# Patient Record
Sex: FEMALE | Race: WHITE | NOT HISPANIC OR LATINO | Employment: FULL TIME | URBAN - METROPOLITAN AREA
[De-identification: names, ages, dates, MRNs, and addresses within clinical notes are randomized per-mention and may not be internally consistent; named-entity substitution may affect disease eponyms.]

---

## 2023-11-19 ENCOUNTER — HOSPITAL ENCOUNTER (EMERGENCY)
Facility: HOSPITAL | Age: 53
Discharge: HOME | End: 2023-11-20
Payer: COMMERCIAL

## 2023-11-19 VITALS
OXYGEN SATURATION: 98 % | SYSTOLIC BLOOD PRESSURE: 174 MMHG | RESPIRATION RATE: 18 BRPM | HEIGHT: 59 IN | HEART RATE: 84 BPM | DIASTOLIC BLOOD PRESSURE: 100 MMHG | WEIGHT: 150 LBS | BODY MASS INDEX: 30.24 KG/M2 | TEMPERATURE: 98.2 F
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
## 2023-11-19 DIAGNOSIS — Z23 ENCOUNTER FOR IMMUNIZATION: ICD-10-CM

## 2023-11-19 DIAGNOSIS — S61.012A LACERATION OF LEFT THUMB WITHOUT FOREIGN BODY WITHOUT DAMAGE TO NAIL, INITIAL ENCOUNTER: Primary | ICD-10-CM

## 2023-11-19 PROBLEM — M54.9 BACK PAIN: Status: ACTIVE | Noted: 2018-06-19

## 2023-11-19 PROBLEM — N20.0 KIDNEY STONE: Status: ACTIVE | Noted: 2023-11-19

## 2023-11-19 PROBLEM — N20.1 RIGHT URETERAL STONE: Status: ACTIVE | Noted: 2023-11-19

## 2023-11-19 PROCEDURE — 99285 EMERGENCY DEPT VISIT HI MDM: CPT

## 2023-11-19 PROCEDURE — 12001 RPR S/N/AX/GEN/TRNK 2.5CM/<: CPT

## 2023-11-19 PROCEDURE — 99283 EMERGENCY DEPT VISIT LOW MDM: CPT | Mod: 25

## 2023-11-19 RX ORDER — LIDOCAINE HYDROCHLORIDE 10 MG/ML
5 INJECTION INFILTRATION; PERINEURAL ONCE
Status: COMPLETED | OUTPATIENT
Start: 2023-11-19 | End: 2023-11-20

## 2023-11-19 RX ORDER — METFORMIN HYDROCHLORIDE 500 MG/1
1500 TABLET, EXTENDED RELEASE ORAL
COMMUNITY
Start: 2023-10-17

## 2023-11-19 RX ORDER — LISINOPRIL 40 MG/1
40 TABLET ORAL
COMMUNITY
Start: 2023-10-17

## 2023-11-19 RX ORDER — ACETAMINOPHEN 325 MG/1
975 TABLET ORAL ONCE
Status: COMPLETED | OUTPATIENT
Start: 2023-11-19 | End: 2023-11-20

## 2023-11-19 RX ORDER — BACITRACIN ZINC 500 UNIT/G
1 OINTMENT IN PACKET (EA) TOPICAL ONCE
Status: COMPLETED | OUTPATIENT
Start: 2023-11-19 | End: 2023-11-20

## 2023-11-19 RX ORDER — ROSUVASTATIN CALCIUM 20 MG/1
1 TABLET, COATED ORAL NIGHTLY
COMMUNITY
Start: 2023-10-09

## 2023-11-19 RX ORDER — AMLODIPINE BESYLATE 2.5 MG/1
2.5 TABLET ORAL
COMMUNITY
Start: 2023-11-15

## 2023-11-19 NOTE — Clinical Note
Ira Qureshi was seen and treated in our emergency department on 11/19/2023.  She may return to work on 11/22/2023.       If you have any questions or concerns, please don't hesitate to call.      Aziza Ibrahim PA-C

## 2023-11-20 PROCEDURE — 2500000005 HC RX 250 GENERAL PHARMACY W/O HCPCS: Performed by: PHYSICIAN ASSISTANT

## 2023-11-20 PROCEDURE — 90471 IMMUNIZATION ADMIN: CPT | Performed by: PHYSICIAN ASSISTANT

## 2023-11-20 PROCEDURE — 2500000001 HC RX 250 WO HCPCS SELF ADMINISTERED DRUGS (ALT 637 FOR MEDICARE OP): Performed by: PHYSICIAN ASSISTANT

## 2023-11-20 PROCEDURE — 2500000004 HC RX 250 GENERAL PHARMACY W/ HCPCS (ALT 636 FOR OP/ED): Performed by: PHYSICIAN ASSISTANT

## 2023-11-20 PROCEDURE — 96372 THER/PROPH/DIAG INJ SC/IM: CPT

## 2023-11-20 PROCEDURE — 90715 TDAP VACCINE 7 YRS/> IM: CPT | Performed by: PHYSICIAN ASSISTANT

## 2023-11-20 RX ADMIN — BACITRACIN 1 APPLICATION: 500 OINTMENT TOPICAL at 00:07

## 2023-11-20 RX ADMIN — ACETAMINOPHEN 975 MG: 325 TABLET ORAL at 00:06

## 2023-11-20 RX ADMIN — LIDOCAINE HYDROCHLORIDE 5 ML: 10 INJECTION, SOLUTION INFILTRATION; PERINEURAL at 00:08

## 2023-11-20 RX ADMIN — TETANUS TOXOID, REDUCED DIPHTHERIA TOXOID AND ACELLULAR PERTUSSIS VACCINE, ADSORBED 0.5 ML: 5; 2.5; 8; 8; 2.5 SUSPENSION INTRAMUSCULAR at 00:07

## 2023-11-20 NOTE — ED PROVIDER NOTES
Chief Complaint   Patient presents with    Finger Laceration   HPI:   Ira Qureshi is a 53 y.o. female with history of HTN, HLD, T2DM that presents to the ED with  for evaluation of left thumb laceration.  Patient states that she was using scissors and her thumb excellently got caught between the blades.  She presents with laceration on both the dorsal and palmar aspect of left first digit.  Cleaned it with water at home.  States tetanus was last updated about 15 to 20 years ago.  Endorses 4/10 pain.  Pain is worse with movement or palpation.  She is not on anticoagulants.  She is right-hand dominant.  Denies any other injuries, pain or symptoms.    Medications: Amlodipine, lisinopril, metformin, rosuvastatin  Soc HX: Denies substance use  Allergies   Allergen Reactions    Doxycycline Hives   : Doxy-rash, cillins-swelling   History reviewed. No pertinent past medical history.  Past Surgical History:   Procedure Laterality Date     SECTION, CLASSIC  2016     Section     No family history on file.     Physical Exam  Vitals and nursing note reviewed.   Constitutional:       General: She is not in acute distress.     Appearance: Normal appearance. She is not ill-appearing or toxic-appearing.   HENT:      Right Ear: External ear normal.      Left Ear: External ear normal.   Eyes:      Pupils: Pupils are equal, round, and reactive to light.   Cardiovascular:      Rate and Rhythm: Normal rate and regular rhythm.      Pulses: Normal pulses.      Heart sounds: No murmur heard.  Pulmonary:      Effort: Pulmonary effort is normal. No respiratory distress.      Breath sounds: Normal breath sounds.   Musculoskeletal:         General: Signs of injury present. No deformity. Normal range of motion.      Comments: Left hand: 1.5 cm laceration across dorsal aspect of first DIP and 0.7 cm angled laceration across palmar aspect of first digit distal to DIP.  Range of motion first digit normal.  No tendon  laceration.  Patient still has two-point sensation intact with normal cap refill.  2+ radial pulse.  She is able to touch the pad of first digit to pad of all other digits.   Skin:     General: Skin is warm and dry.      Capillary Refill: Capillary refill takes less than 2 seconds.      Comments: See MSK   Neurological:      General: No focal deficit present.      Mental Status: She is alert.      Cranial Nerves: No cranial nerve deficit.     VS: As documented in the triage note and EMR flowsheet from this visit were reviewed.    Medical Decision Making:   ED Course as of 11/20/23 0024   Sun Nov 19, 2023 2257 Vitals Reviewed: Afebrile. Hypertensive. Not tachycardic nor tachypneic. No hypoxia.   [KA]   2300 Chart review: Patient seen by PCP 11/15 for follow-up on HTN. Still not at goal. PCP added amlodipine.  [KA]   2343 Patient is a 53-year-old female who presents to the ED for evaluation of left first digit laceration x2.  Will necessitate laceration repair.  Patient has 1.5 cm linear laceration across dorsum of first DIP and 0.7 cm laceration palmar aspect distal to DIP.  Range of motion intact.  Two-point sensation intact.  No tendon damage.  Normal cap refill.  Patient wound to be soaked in a mixture of Hibiclens, hydrogen peroxide.  We will then perform digital block, irrigate under pressure and repair.  Patient will receive tetanus.  Will be given Tylenol for pain.  Considered x-ray imaging however do not suspect bony damage and there is no foreign body noted on exam.  Do not feel imaging needed. [KA]   Mon Nov 20, 2023   0023 Patient tolerated laceration repair well.  She is able to verbalize understanding of wound care instructions using teach back method.  Advised patient to follow-up with PCP, hand or return to ED for suture removal in 10 to 14 days.  We discussed strict return precautions including any purulent drainage, fever, red streaking.  Patient agreeable. [KA]      ED Course User Index  [KA]  Aziza Ibrahim PA-C         Diagnoses as of 11/20/23 0024   Laceration of left thumb without foreign body without damage to nail, initial encounter   Accident caused by scissors, initial encounter   Encounter for immunization     Laceration Repair    Performed by: Aziza Ibrahim PA-C  Authorized by: Aziza Ibrahim PA-C    Consent:     Consent obtained:  Verbal    Consent given by:  Patient    Risks, benefits, and alternatives were discussed: yes      Risks discussed:  Infection, pain and poor cosmetic result  Universal protocol:     Procedure explained and questions answered to patient or proxy's satisfaction: yes      Patient identity confirmed:  Verbally with patient  Anesthesia:     Anesthesia method: intrathecal nerve block L 1st digit w/1.5cc lidocaine 1% w/o epi.  Laceration details:     Location:  Finger    Finger location:  L thumb    Wound length (cm): 1.5 cm linear across dorsal DIP, 0.7 cm angled across palmar distal to DIP.  Pre-procedure details:     Preparation:  Patient was prepped and draped in usual sterile fashion  Exploration:     Hemostasis achieved with:  Direct pressure    Imaging outcome: foreign body not noted      Wound exploration: wound explored through full range of motion and entire depth of wound visualized      Contaminated: no    Treatment:     Area cleansed with:  Povidone-iodine, saline and soap and water    Amount of cleaning:  Standard    Debridement:  None    Undermining:  None  Skin repair:     Repair method:  Sutures    Suture size:  3-0    Suture material:  Nylon    Suture technique:  Simple interrupted    Number of sutures: 2 sutures dorsal wound, 1 suture palmar wound.  Approximation:     Approximation:  Close  Repair type:     Repair type:  Simple  Post-procedure details:     Dressing:  Antibiotic ointment, non-adherent dressing and bulky dressing     Chronic Medical Conditions Significantly Affecting Care:      Escalation of Care: Appropriate for outpatient  management     Counseling: Spoke with the patient and discussed today´s findings, in addition to providing specific details for the plan of care and expected course.  Patient was given the opportunity to ask questions.    Discussed return precautions and importance of follow-up.  Advised to follow-up with hand.  Advised to return to the ED for changing or worsening symptoms, new symptoms, complaint specific precautions, and precautions listed on the discharge paperwork.  Educated on the common potential side effects of medications prescribed.    I advised the patient that the emergency evaluation and treatment provided today doesn't end their need for medical care. It is very important that they follow-up with their primary care provider or other specialist as instructed.    The plan of care was mutually agreed upon with the patient. The patient and/or family were given the opportunity to ask questions. All questions asked today in the ED were answered to the best of my ability with today's information.    I specifically advised the patient to return to the ED for changing or worsening symptoms, worrisome new symptoms, or for any complaint specific precautions listed on the discharge paperwork.    This patient was cared for in the setting of nationwide stress on resources and staffing.    This report was transcribed using voice recognition software.  Every effort was made to ensure accuracy, however, inadvertently computerized transcription errors may be present.       Aziza Ibrahim PA-C  11/20/23 0023       Aziza Ibrahim PA-C  11/20/23 0024

## 2023-11-20 NOTE — DISCHARGE INSTRUCTIONS
Please keep wound clean dry and covered for 24 hours.  After 24 hours may remove bandage and leave open to air.  Make sure you wash your hands at least 2 times per day with gentle soap.  Pat dry.  Do not submerge hand in water until after stitches are removed in 10 to 14 days.  Can follow-up with orthopedic hand, your primary care doctor or return to ER for them to be removed.  Using Q-tip, apply topical antibiotic twice per day to help aid in wound healing and improve itching.  Return to nearest ER for any signs of infection.

## 2023-11-20 NOTE — ED TRIAGE NOTES
Patient was using scissor to do non scissor stuff and has laceration on dorsal and palmar side of thumb.  Still bleeding.